# Patient Record
Sex: MALE | Race: WHITE
[De-identification: names, ages, dates, MRNs, and addresses within clinical notes are randomized per-mention and may not be internally consistent; named-entity substitution may affect disease eponyms.]

---
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
## 2023-10-02 DIAGNOSIS — M54.2 NECK PAIN: Primary | ICD-10-CM

## 2023-10-04 ENCOUNTER — TELEPHONE (OUTPATIENT)
Facility: CLINIC | Age: 23
End: 2023-10-04

## 2023-10-04 NOTE — TELEPHONE ENCOUNTER
Called [facility] for results, no answer left message and faxed a request   Imaging to be faxed to [phone]